# Patient Record
Sex: MALE | URBAN - NONMETROPOLITAN AREA
[De-identification: names, ages, dates, MRNs, and addresses within clinical notes are randomized per-mention and may not be internally consistent; named-entity substitution may affect disease eponyms.]

---

## 2018-05-07 ENCOUNTER — DOCTOR'S OFFICE (OUTPATIENT)
Dept: URBAN - NONMETROPOLITAN AREA CLINIC 1 | Facility: CLINIC | Age: 53
Setting detail: OPHTHALMOLOGY
End: 2018-05-07
Payer: COMMERCIAL

## 2018-05-07 ENCOUNTER — RX ONLY (RX ONLY)
Age: 53
End: 2018-05-07

## 2018-05-07 DIAGNOSIS — H52.4: ICD-10-CM

## 2018-05-07 DIAGNOSIS — H52.03: ICD-10-CM

## 2018-05-07 PROCEDURE — 92015 DETERMINE REFRACTIVE STATE: CPT | Performed by: OPTOMETRIST

## 2018-05-07 PROCEDURE — 92004 COMPRE OPH EXAM NEW PT 1/>: CPT | Performed by: OPTOMETRIST

## 2018-05-07 ASSESSMENT — REFRACTION_MANIFEST
OS_VA2: 20/
OS_VA1: 20/
OD_VA1: 20/
OD_VA2: 20/
OS_VA3: 20/
OD_VA3: 20/
OD_VA3: 20/
OD_VA2: 20/
OU_VA: 20/
OS_VA2: 20/
OU_VA: 20/
OS_VA1: 20/
OS_VA3: 20/
OD_VA1: 20/

## 2018-05-07 ASSESSMENT — SPHEQUIV_DERIVED
OS_SPHEQUIV: 2.625
OD_SPHEQUIV: 2.375

## 2018-05-07 ASSESSMENT — REFRACTION_OUTSIDERX
OD_VA1: 20/20
OS_SPHERE: +3.00
OS_VA2: 20/25
OD_VA3: 20/
OS_CYLINDER: -0.50
OD_ADD: +2.25
OD_SPHERE: +2.50
OS_ADD: +2.25
OS_VA3: 20/
OS_AXIS: 075
OU_VA: 20/20
OS_VA1: 20/20
OD_VA2: 20/25

## 2018-05-07 ASSESSMENT — REFRACTION_CURRENTRX
OS_OVR_VA: 20/
OD_VPRISM_DIRECTION: SV
OD_OVR_VA: 20/
OS_OVR_VA: 20/
OD_SPHERE: +3.25
OS_SPHERE: +3.25
OD_OVR_VA: 20/
OS_OVR_VA: 20/
OS_VPRISM_DIRECTION: SV
OD_OVR_VA: 20/

## 2018-05-07 ASSESSMENT — REFRACTION_AUTOREFRACTION
OD_AXIS: 76
OD_CYLINDER: -0.25
OS_SPHERE: +3.00
OS_CYLINDER: -0.75
OD_SPHERE: +2.50
OS_AXIS: 67

## 2018-05-07 ASSESSMENT — CONFRONTATIONAL VISUAL FIELD TEST (CVF)
OD_FINDINGS: FULL
OS_FINDINGS: FULL

## 2018-05-07 ASSESSMENT — VISUAL ACUITY
OS_BCVA: 20/100+1
OD_BCVA: 20/70

## 2018-07-05 ENCOUNTER — OPTICAL OFFICE (OUTPATIENT)
Dept: URBAN - NONMETROPOLITAN AREA CLINIC 4 | Facility: CLINIC | Age: 53
Setting detail: OPHTHALMOLOGY
End: 2018-07-05
Payer: COMMERCIAL

## 2018-07-05 DIAGNOSIS — H52.4: ICD-10-CM

## 2018-07-05 PROCEDURE — V2202 LENS SPHERE BIFOCAL 7.12-20.: HCPCS | Performed by: OPTOMETRIST

## 2018-07-05 PROCEDURE — V2784 LENS POLYCARB OR EQUAL: HCPCS | Performed by: OPTOMETRIST

## 2018-07-05 PROCEDURE — V2020 VISION SVCS FRAMES PURCHASES: HCPCS | Performed by: OPTOMETRIST

## 2018-07-05 PROCEDURE — V2203 LENS SPHCYL BIFOCAL 4.00D/.1: HCPCS | Performed by: OPTOMETRIST

## 2020-11-16 ENCOUNTER — DOCTOR'S OFFICE (OUTPATIENT)
Dept: URBAN - NONMETROPOLITAN AREA CLINIC 1 | Facility: CLINIC | Age: 55
Setting detail: OPHTHALMOLOGY
End: 2020-11-16
Payer: COMMERCIAL

## 2020-11-16 ENCOUNTER — OPTICAL OFFICE (OUTPATIENT)
Dept: URBAN - NONMETROPOLITAN AREA CLINIC 4 | Facility: CLINIC | Age: 55
Setting detail: OPHTHALMOLOGY
End: 2020-11-16
Payer: COMMERCIAL

## 2020-11-16 DIAGNOSIS — H52.4: ICD-10-CM

## 2020-11-16 DIAGNOSIS — H52.223: ICD-10-CM

## 2020-11-16 DIAGNOSIS — H52.03: ICD-10-CM

## 2020-11-16 PROCEDURE — V2020 VISION SVCS FRAMES PURCHASES: HCPCS | Performed by: OPTOMETRIST

## 2020-11-16 PROCEDURE — 92014 COMPRE OPH EXAM EST PT 1/>: CPT | Performed by: OPTOMETRIST

## 2020-11-16 PROCEDURE — 92015 DETERMINE REFRACTIVE STATE: CPT | Performed by: OPTOMETRIST

## 2020-11-16 PROCEDURE — V2202 LENS SPHERE BIFOCAL 7.12-20.: HCPCS | Performed by: OPTOMETRIST

## 2020-11-16 PROCEDURE — V2203 LENS SPHCYL BIFOCAL 4.00D/.1: HCPCS | Performed by: OPTOMETRIST

## 2020-11-16 PROCEDURE — V2784 LENS POLYCARB OR EQUAL: HCPCS | Performed by: OPTOMETRIST

## 2020-11-16 ASSESSMENT — CONFRONTATIONAL VISUAL FIELD TEST (CVF)
OS_FINDINGS: FULL
OD_FINDINGS: FULL

## 2020-11-16 ASSESSMENT — REFRACTION_CURRENTRX
OD_OVR_VA: 20/
OS_VPRISM_DIRECTION: SV
OS_SPHERE: +3.25
OD_SPHERE: +3.25
OS_OVR_VA: 20/
OD_VPRISM_DIRECTION: SV

## 2020-11-16 ASSESSMENT — REFRACTION_MANIFEST
OS_SPHERE: +3.50
OD_AXIS: 095
OS_VA1: 20/20
OD_VA1: 20/20
OD_ADD: +2.50
OS_AXIS: 090
OS_VA2: 20/25
OD_SPHERE: +2.75
OU_VA: 20/20
OS_ADD: +2.50
OD_CYLINDER: -0.25
OS_CYLINDER: -0.75
OD_VA2: 20/25

## 2020-11-16 ASSESSMENT — REFRACTION_AUTOREFRACTION
OS_AXIS: 092
OS_CYLINDER: -0.75
OD_CYLINDER: -0.25
OD_SPHERE: +2.75
OD_AXIS: 096
OS_SPHERE: +3.50

## 2020-11-16 ASSESSMENT — SPHEQUIV_DERIVED
OS_SPHEQUIV: 3.125
OD_SPHEQUIV: 2.625
OD_SPHEQUIV: 2.625
OS_SPHEQUIV: 3.125

## 2020-11-16 ASSESSMENT — TONOMETRY
OS_IOP_MMHG: 14
OD_IOP_MMHG: 14

## 2020-11-16 ASSESSMENT — VISUAL ACUITY
OD_BCVA: 20/150
OS_BCVA: 20/80

## 2021-02-03 ENCOUNTER — OPTICAL OFFICE (OUTPATIENT)
Dept: URBAN - NONMETROPOLITAN AREA CLINIC 4 | Facility: CLINIC | Age: 56
Setting detail: OPHTHALMOLOGY
End: 2021-02-03
Payer: COMMERCIAL

## 2021-02-03 DIAGNOSIS — H52.223: ICD-10-CM

## 2021-02-03 PROCEDURE — V2020 VISION SVCS FRAMES PURCHASES: HCPCS | Performed by: OPTOMETRIST

## 2021-02-08 ENCOUNTER — HOSPITAL ENCOUNTER (EMERGENCY)
Facility: HOSPITAL | Age: 56
Discharge: HOME | End: 2021-02-08
Attending: EMERGENCY MEDICINE | Admitting: EMERGENCY MEDICINE
Payer: COMMERCIAL

## 2021-02-08 ENCOUNTER — APPOINTMENT (EMERGENCY)
Dept: CT IMAGING | Facility: HOSPITAL | Age: 56
End: 2021-02-08
Payer: COMMERCIAL

## 2021-02-08 VITALS
HEART RATE: 104 BPM | OXYGEN SATURATION: 98 % | SYSTOLIC BLOOD PRESSURE: 162 MMHG | RESPIRATION RATE: 18 BRPM | TEMPERATURE: 98 F | WEIGHT: 246.91 LBS | DIASTOLIC BLOOD PRESSURE: 102 MMHG
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
## 2021-02-08 DIAGNOSIS — S20.229A CONTUSION OF MID BACK: ICD-10-CM

## 2021-02-08 PROCEDURE — 99283 EMERGENCY DEPT VISIT LOW MDM: CPT

## 2021-02-08 PROCEDURE — 72128 CT CHEST SPINE W/O DYE: CPT

## 2021-02-08 PROCEDURE — 99282 EMERGENCY DEPT VISIT SF MDM: CPT | Performed by: EMERGENCY MEDICINE

## 2021-02-08 RX ORDER — OXYCODONE HYDROCHLORIDE AND ACETAMINOPHEN 5; 325 MG/1; MG/1
1 TABLET ORAL ONCE
Status: COMPLETED | OUTPATIENT
Start: 2021-02-08 | End: 2021-02-08

## 2021-02-08 RX ADMIN — OXYCODONE HYDROCHLORIDE AND ACETAMINOPHEN 1 TABLET: 5; 325 TABLET ORAL at 01:17

## 2021-02-08 NOTE — ED PROVIDER NOTES
History  Chief Complaint   Patient presents with    Back Pain     Patient states he slipped and fell on ice earlier today  Reporting mid back pain, right side  States he hit his head but no LOC  No blood thinners  Took motrin PTA  Patient is a 61-year-old male presents the emergency department complaining of right lower thoracic and midthoracic back pain after he slipped and fell onto his back earlier today after he helped another lady up who slipped on the ice also  Patient reports that he did fall onto his back and hit his head but denies any loss of consciousness no anticoagulants no head pain no neck pain no other injury  Patient took ibuprofen for the pain prior to arrival he ambulated into the ED  No lower back pain  No numbness or weakness  History provided by:  Patient  Fall  Mechanism of injury: fall    Injury location:  Torso  Torso injury location:  Back  Time since incident:  1 day  Fall:     Fall occurred:  Walking    Impact surface: Ice  Associated symptoms: back pain    Associated symptoms: no abdominal pain, no chest pain, no headaches, no nausea and no vomiting        None       History reviewed  No pertinent past medical history  History reviewed  No pertinent surgical history  History reviewed  No pertinent family history  I have reviewed and agree with the history as documented  E-Cigarette/Vaping    E-Cigarette Use Never User      E-Cigarette/Vaping Substances     Social History     Tobacco Use    Smoking status: Never Smoker    Smokeless tobacco: Current User     Types: Chew   Substance Use Topics    Alcohol use: Not Currently    Drug use: Not Currently       Review of Systems   Constitutional: Negative for activity change, appetite change, chills, fatigue and fever  HENT: Negative for congestion, ear pain, rhinorrhea and sore throat  Eyes: Negative for discharge, redness and visual disturbance     Respiratory: Negative for cough, chest tightness, shortness of breath and wheezing  Cardiovascular: Negative for chest pain and palpitations  Gastrointestinal: Negative for abdominal pain, constipation, diarrhea, nausea and vomiting  Endocrine: Negative for polydipsia and polyuria  Genitourinary: Negative for difficulty urinating, dysuria, frequency, hematuria and urgency  Musculoskeletal: Positive for back pain  Negative for arthralgias and myalgias  Skin: Negative for color change, pallor and rash  Neurological: Negative for dizziness, weakness, light-headedness, numbness and headaches  Hematological: Negative for adenopathy  Does not bruise/bleed easily  All other systems reviewed and are negative  Physical Exam  Physical Exam  Vitals signs and nursing note reviewed  Constitutional:       Appearance: He is well-developed  HENT:      Head: Normocephalic and atraumatic  Right Ear: External ear normal       Left Ear: External ear normal       Nose: Nose normal    Eyes:      Conjunctiva/sclera: Conjunctivae normal       Pupils: Pupils are equal, round, and reactive to light  Neck:      Musculoskeletal: Normal range of motion and neck supple  Cardiovascular:      Rate and Rhythm: Normal rate and regular rhythm  Heart sounds: Normal heart sounds  Pulmonary:      Effort: Pulmonary effort is normal  No respiratory distress  Breath sounds: Normal breath sounds  No wheezing or rales  Chest:      Chest wall: No tenderness  Abdominal:      General: Bowel sounds are normal  There is no distension  Palpations: Abdomen is soft  Tenderness: There is no abdominal tenderness  There is no guarding  Musculoskeletal:      Thoracic back: He exhibits decreased range of motion, tenderness, pain and spasm  Skin:     General: Skin is warm and dry  Neurological:      Mental Status: He is alert and oriented to person, place, and time  Cranial Nerves: No cranial nerve deficit  Sensory: No sensory deficit           Vital Signs  ED Triage Vitals [02/08/21 0023]   Temperature Pulse Respirations Blood Pressure SpO2   98 °F (36 7 °C) (!) 115 18 (!) 185/118 98 %      Temp Source Heart Rate Source Patient Position - Orthostatic VS BP Location FiO2 (%)   Temporal Monitor Sitting Right arm --      Pain Score       --           Vitals:    02/08/21 0023 02/08/21 0045   BP: (!) 185/118 (!) 162/102   Pulse: (!) 115 104   Patient Position - Orthostatic VS: Sitting          Visual Acuity  Visual Acuity      Most Recent Value   L Pupil Size (mm)  2   R Pupil Size (mm)  2          ED Medications  Medications - No data to display    Diagnostic Studies  Results Reviewed     None                 CT thoracic spine without contrast   Final Result by Sarah Ragland MD (02/08 0109)      No thoracic spine fracture or subluxation  Workstation performed: [id number]                    Procedures  Procedures         ED Course                             SBIRT 22yo+      Most Recent Value   SBIRT (23 yo +)   In order to provide better care to our patients, we are screening all of our patients for alcohol and drug use  Would it be okay to ask you these screening questions? No Filed at: 02/08/2021 0041                    MDM  Number of Diagnoses or Management Options  Contusion of mid back: new and requires workup  Fall, initial encounter: new and requires workup  Diagnosis management comments: Patient is clinically hemodynamically and neurologically stable in the emergency department no indication for CT imaging the brain based on mechanism and history  CT of the thoracic spine reveals no acute fracture or traumatic dislocation advised supportive care for back strain and contusion and follow-up with primary physician Orthopedics for further evaluation treatment return precautions and anticipatory guidance discussed           Amount and/or Complexity of Data Reviewed  Tests in the radiology section of CPT®: ordered and reviewed  Decide to obtain previous medical records or to obtain history from someone other than the patient: yes  Review and summarize past medical records: yes  Independent visualization of images, tracings, or specimens: yes    Risk of Complications, Morbidity, and/or Mortality  Presenting problems: moderate  Diagnostic procedures: moderate  Management options: moderate    Patient Progress  Patient progress: stable      Disposition  Final diagnoses:   Fall, initial encounter   Contusion of mid back     Time reflects when diagnosis was documented in both MDM as applicable and the Disposition within this note     Time User Action Codes Description Comment    2/8/2021  1:12 AM Adan Moore Add [F12  XXXA] Fall, initial encounter     2/8/2021  1:12 AM Adan Moore Add [S20 229A] Contusion of mid back       ED Disposition     ED Disposition Condition Date/Time Comment    Discharge Stable Mon Feb 8, 2021  1:12 AM Frances Arriaza discharge to home/self care  Follow-up Information     Follow up With Specialties Details Why Contact Info    Lan Amaya MD  Schedule an appointment as soon as possible for a visit in 3 days  [address] [place] [id number]  [address] [place] [address] Orthopedic Surgery Schedule an appointment as soon as possible for a visit in 3 days  [address] [facility]  [address] [facility]  [phone]            Patient's Medications    No medications on file     No discharge procedures on file      PDMP Review     None          ED Provider  Electronically Signed by           Mathieu Johnston DO  02/08/21 9197

## 2021-02-13 ENCOUNTER — HOSPITAL ENCOUNTER (EMERGENCY)
Facility: HOSPITAL | Age: 56
Discharge: HOME | End: 2021-02-13
Attending: EMERGENCY MEDICINE
Payer: COMMERCIAL

## 2021-02-13 ENCOUNTER — APPOINTMENT (EMERGENCY)
Dept: RADIOLOGY | Facility: HOSPITAL | Age: 56
End: 2021-02-13
Payer: COMMERCIAL

## 2021-02-13 VITALS
TEMPERATURE: 97.5 F | HEART RATE: 114 BPM | RESPIRATION RATE: 18 BRPM | OXYGEN SATURATION: 98 % | SYSTOLIC BLOOD PRESSURE: 173 MMHG | DIASTOLIC BLOOD PRESSURE: 119 MMHG | WEIGHT: 246.91 LBS
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
## 2021-02-13 DIAGNOSIS — S20.221A CONTUSION OF RIGHT BACK WALL OF THORAX, INITIAL ENCOUNTER: ICD-10-CM

## 2021-02-13 DIAGNOSIS — S39.012A BACK STRAIN, INITIAL ENCOUNTER: ICD-10-CM

## 2021-02-13 PROCEDURE — 99283 EMERGENCY DEPT VISIT LOW MDM: CPT

## 2021-02-13 PROCEDURE — 72070 X-RAY EXAM THORAC SPINE 2VWS: CPT

## 2021-02-13 PROCEDURE — 99284 EMERGENCY DEPT VISIT MOD MDM: CPT | Performed by: EMERGENCY MEDICINE

## 2021-02-13 RX ORDER — OXYCODONE HYDROCHLORIDE AND ACETAMINOPHEN 5; 325 MG/1; MG/1
1 TABLET ORAL ONCE
Status: COMPLETED | OUTPATIENT
Start: 2021-02-13 | End: 2021-02-13

## 2021-02-13 RX ORDER — OXYCODONE HYDROCHLORIDE AND ACETAMINOPHEN 5; 325 MG/1; MG/1
1 TABLET ORAL EVERY 4 HOURS PRN
Qty: 15 TABLET | Refills: 0 | Status: SHIPPED | OUTPATIENT
Start: 2021-02-13

## 2021-02-13 RX ADMIN — OXYCODONE HYDROCHLORIDE AND ACETAMINOPHEN 1 TABLET: 5; 325 TABLET ORAL at 21:09

## 2021-02-14 NOTE — ED PROVIDER NOTES
History  Chief Complaint   Patient presents with    Back Pain     Patient fell approximately 2 days ago and "felt a pop" in his back  Reporting increased pain and states "something is moving back there " Did not hit his head  No LOC  No blood thinners  Patient is a 49-year-old male presents the emergency department complaining of right upper back pain after he tripped and fell on ice 2nd time in 5 days this occurred denies any strike on head or loss of consciousness no anticoagulants patient complains of pain in the right midthoracic spine just under the right scapula worse with movement similar to the pain that he had after his fall that he was evaluated for with a CT of thoracic spine 5 days ago  No loss of bladder or bowel no numbness or weakness in the arms or legs  Patient has scheduled a follow-up appointment with Orthopedics as recommended however he has not been seen yet  History provided by:  Patient  Back Pain  Location:  Thoracic spine  Quality:  Aching and cramping  Radiates to:  Does not radiate  Pain severity:  Moderate  Pain is: Worse during the day  Onset quality:  Gradual  Duration:  2 days  Timing:  Constant  Progression:  Worsening  Chronicity:  Recurrent  Context: falling and recent injury    Associated symptoms: no abdominal pain, no chest pain, no dysuria, no fever, no headaches, no numbness and no weakness        None       No past medical history on file  No past surgical history on file  No family history on file  I have reviewed and agree with the history as documented  E-Cigarette/Vaping    E-Cigarette Use Never User      E-Cigarette/Vaping Substances     Social History     Tobacco Use    Smoking status: Never Smoker    Smokeless tobacco: Current User     Types: Chew   Substance Use Topics    Alcohol use: Not Currently    Drug use: Not Currently       Review of Systems   Constitutional: Negative for activity change, appetite change, chills, fatigue and fever  HENT: Negative for congestion, ear pain, rhinorrhea and sore throat  Eyes: Negative for discharge, redness and visual disturbance  Respiratory: Negative for cough, chest tightness, shortness of breath and wheezing  Cardiovascular: Negative for chest pain and palpitations  Gastrointestinal: Negative for abdominal pain, constipation, diarrhea, nausea and vomiting  Endocrine: Negative for polydipsia and polyuria  Genitourinary: Negative for difficulty urinating, dysuria, frequency, hematuria and urgency  Musculoskeletal: Positive for back pain  Negative for arthralgias and myalgias  Skin: Negative for color change, pallor and rash  Neurological: Negative for dizziness, weakness, light-headedness, numbness and headaches  Hematological: Negative for adenopathy  Does not bruise/bleed easily  All other systems reviewed and are negative  Physical Exam  Physical Exam  Vitals signs and nursing note reviewed  Constitutional:       Appearance: He is well-developed  HENT:      Head: Normocephalic and atraumatic  Right Ear: External ear normal       Left Ear: External ear normal       Nose: Nose normal    Eyes:      Conjunctiva/sclera: Conjunctivae normal       Pupils: Pupils are equal, round, and reactive to light  Neck:      Musculoskeletal: Normal range of motion and neck supple  Cardiovascular:      Rate and Rhythm: Normal rate and regular rhythm  Heart sounds: Normal heart sounds  Pulmonary:      Effort: Pulmonary effort is normal  No respiratory distress  Breath sounds: Normal breath sounds  No wheezing or rales  Chest:      Chest wall: No tenderness  Abdominal:      General: Bowel sounds are normal  There is no distension  Palpations: Abdomen is soft  Tenderness: There is no abdominal tenderness  There is no guarding  Musculoskeletal:      Thoracic back: He exhibits tenderness, pain and spasm  He exhibits normal range of motion     Skin:     General: Skin is warm and dry  Neurological:      Mental Status: He is alert and oriented to person, place, and time  Cranial Nerves: No cranial nerve deficit  Sensory: No sensory deficit  Vital Signs  ED Triage Vitals [02/13/21 2105]   Temperature Pulse Respirations Blood Pressure SpO2   97 5 °F (36 4 °C) (!) 114 18 (!) 173/119 98 %      Temp Source Heart Rate Source Patient Position - Orthostatic VS BP Location FiO2 (%)   Temporal Monitor Sitting Right arm --      Pain Score       8           Vitals:    02/13/21 2105   BP: (!) 173/119   Pulse: (!) 114   Patient Position - Orthostatic VS: Sitting         Visual Acuity      ED Medications  Medications   oxyCODONE-acetaminophen (PERCOCET) 5-325 mg per tablet 1 tablet (1 tablet Oral Given 2/13/21 2109)       Diagnostic Studies  Results Reviewed     None                 XR thoracic spine 2 views   ED Interpretation by Chinmay Juárez DO (02/13 2133)   No acute fracture or dislocation                 Procedures  Procedures         ED Course                             SBIRT 20yo+      Most Recent Value   SBIRT (23 yo +)   In order to provide better care to our patients, we are screening all of our patients for alcohol and drug use  Would it be okay to ask you these screening questions?   Unable to answer at this time Filed at: 02/13/2021 2112                    MDM  Number of Diagnoses or Management Options  Back strain, initial encounter: new and requires workup  Contusion of right back wall of thorax, initial encounter: new and requires workup  Fall, initial encounter: new and requires workup  Diagnosis management comments: Patient is clinically hemodynamically neurologically stable and intact in the ED he is ambulatory in the ED without issue history and physical examination is consistent with a recurrent contusion and strain of the right upper back repeat thoracic spine x-rays reveal no acute fracture dislocation at this time advised continue supportive care will prescribe oral analgesics for additional pain control advised prompt follow-up with Orthopedics for further evaluation treatment he reports that he already has that scheduled and will follow up promptly Orthopedics and PCP as advised return precautions and anticipatory guidance discussed  Amount and/or Complexity of Data Reviewed  Tests in the radiology section of CPT®: ordered and reviewed  Decide to obtain previous medical records or to obtain history from someone other than the patient: yes  Review and summarize past medical records: yes  Independent visualization of images, tracings, or specimens: yes    Risk of Complications, Morbidity, and/or Mortality  Presenting problems: low  Diagnostic procedures: low  Management options: low    Patient Progress  Patient progress: stable      Disposition  Final diagnoses:   Fall, initial encounter   Contusion of right back wall of thorax, initial encounter   Back strain, initial encounter     Time reflects when diagnosis was documented in both MDM as applicable and the Disposition within this note     Time User Action Codes Description Comment    2/13/2021  9:10 PM Tamara Wallace Add [W19  FJFR] Fall, initial encounter     2/13/2021  9:10 PM Tamara Wallace Add [S20 221A] Contusion of right back wall of thorax, initial encounter     2/13/2021  9:10 PM David López Add [S39 012A] Back strain, initial encounter       ED Disposition     ED Disposition Condition Date/Time Comment    Discharge Stable Sat Feb 13, 2021  9:10 PM Humberto Haider discharge to home/self care              Follow-up Information     Follow up With Specialties Details Why Contact Rob Traylor DO Orthopedic Surgery Schedule an appointment as soon as possible for a visit in 3 days  [address] Jake Caro MD  Schedule an appointment as soon as possible for a visit in 3 days  [address] [facility]  [phone] Patient's Medications   Discharge Prescriptions    OXYCODONE-ACETAMINOPHEN (PERCOCET) 5-325 MG PER TABLET    Take 1 tablet by mouth every 4 (four) hours as needed for moderate pain for up to 15 dosesMax Daily Amount: 6 tablets       Start Date: 2/13/2021 End Date: --       Order Dose: 1 tablet       Quantity: 15 tablet    Refills: 0         PDMP Review       Value Time User    PDMP Reviewed  Yes 2/13/2021  9:02 PM Sara Vuong DO          ED Provider  Electronically Signed by           Sara Vuong DO  02/13/21 6201

## 2021-04-08 ENCOUNTER — OPTICAL OFFICE (OUTPATIENT)
Dept: URBAN - NONMETROPOLITAN AREA CLINIC 4 | Facility: CLINIC | Age: 56
Setting detail: OPHTHALMOLOGY
End: 2021-04-08
Payer: COMMERCIAL

## 2021-04-08 DIAGNOSIS — H52.223: ICD-10-CM

## 2021-04-08 PROCEDURE — V2784 LENS POLYCARB OR EQUAL: HCPCS | Performed by: OPTOMETRIST

## 2021-04-08 PROCEDURE — V2203 LENS SPHCYL BIFOCAL 4.00D/.1: HCPCS | Performed by: OPTOMETRIST

## 2021-04-08 PROCEDURE — V2020 VISION SVCS FRAMES PURCHASES: HCPCS | Performed by: OPTOMETRIST

## 2021-04-08 PROCEDURE — V2202 LENS SPHERE BIFOCAL 7.12-20.: HCPCS | Performed by: OPTOMETRIST

## 2021-09-07 ENCOUNTER — OPTICAL OFFICE (OUTPATIENT)
Dept: URBAN - NONMETROPOLITAN AREA CLINIC 4 | Facility: CLINIC | Age: 56
Setting detail: OPHTHALMOLOGY
End: 2021-09-07
Payer: COMMERCIAL

## 2021-09-07 DIAGNOSIS — H52.223: ICD-10-CM

## 2021-09-07 PROCEDURE — V2020 VISION SVCS FRAMES PURCHASES: HCPCS | Performed by: OPTOMETRIST

## 2021-09-07 PROCEDURE — V2784 LENS POLYCARB OR EQUAL: HCPCS | Performed by: OPTOMETRIST

## 2021-09-07 PROCEDURE — V2202 LENS SPHERE BIFOCAL 7.12-20.: HCPCS | Performed by: OPTOMETRIST

## 2021-11-17 ENCOUNTER — DOCTOR'S OFFICE (OUTPATIENT)
Dept: URBAN - NONMETROPOLITAN AREA CLINIC 1 | Facility: CLINIC | Age: 56
Setting detail: OPHTHALMOLOGY
End: 2021-11-17
Payer: COMMERCIAL

## 2021-11-17 VITALS — HEIGHT: 60 IN

## 2021-11-17 DIAGNOSIS — H25.013: ICD-10-CM

## 2021-11-17 DIAGNOSIS — H43.393: ICD-10-CM

## 2021-11-17 DIAGNOSIS — H52.4: ICD-10-CM

## 2021-11-17 PROBLEM — H52.03 HYPEROPIA; BOTH EYES: Status: ACTIVE | Noted: 2018-05-07

## 2021-11-17 PROCEDURE — 92015 DETERMINE REFRACTIVE STATE: CPT | Performed by: OPTOMETRIST

## 2021-11-17 PROCEDURE — 92014 COMPRE OPH EXAM EST PT 1/>: CPT | Performed by: OPTOMETRIST

## 2021-11-17 ASSESSMENT — SPHEQUIV_DERIVED
OS_SPHEQUIV: 3.25
OS_SPHEQUIV: 3.125
OD_SPHEQUIV: 2.875
OD_SPHEQUIV: 3.25

## 2021-11-17 ASSESSMENT — REFRACTION_CURRENTRX
OS_SPHERE: +3.50
OS_VPRISM_DIRECTION: SV
OD_CYLINDER: -0.25
OD_VPRISM_DIRECTION: SV
OS_CYLINDER: -0.75
OS_OVR_VA: 20/
OD_OVR_VA: 20/
OD_SPHERE: +2.75
OS_AXIS: 94
OD_AXIS: 90

## 2021-11-17 ASSESSMENT — CONFRONTATIONAL VISUAL FIELD TEST (CVF)
OS_FINDINGS: FULL
OD_FINDINGS: FULL

## 2021-11-17 ASSESSMENT — REFRACTION_AUTOREFRACTION
OD_CYLINDER: 0.00
OS_SPHERE: +3.25
OD_AXIS: 180
OS_AXIS: 180
OD_SPHERE: +3.25
OS_CYLINDER: 0.00

## 2021-11-17 ASSESSMENT — REFRACTION_MANIFEST
OS_ADD: +2.50
OD_SPHERE: +3.00
OD_ADD: +2.50
OD_VA1: 20/20
OS_VA1: 20/20
OS_AXIS: 090
OS_VA2: 20/25
OU_VA: 20/20
OD_VA2: 20/25
OD_CYLINDER: -0.25
OS_SPHERE: +3.50
OS_CYLINDER: -0.75
OD_AXIS: 095

## 2021-11-17 ASSESSMENT — VISUAL ACUITY
OD_BCVA: 20/20
OS_BCVA: 20/20

## 2022-06-29 ENCOUNTER — OPTICAL OFFICE (OUTPATIENT)
Dept: URBAN - NONMETROPOLITAN AREA CLINIC 4 | Facility: CLINIC | Age: 57
Setting detail: OPHTHALMOLOGY
End: 2022-06-29
Payer: COMMERCIAL

## 2022-06-29 DIAGNOSIS — H52.223: ICD-10-CM

## 2022-06-29 PROCEDURE — V2020 VISION SVCS FRAMES PURCHASES: HCPCS | Performed by: OPTOMETRIST

## 2022-06-29 PROCEDURE — V2784 LENS POLYCARB OR EQUAL: HCPCS | Performed by: OPTOMETRIST

## 2022-06-29 PROCEDURE — V2203 LENS SPHCYL BIFOCAL 4.00D/.1: HCPCS | Performed by: OPTOMETRIST

## 2022-06-29 PROCEDURE — V2202 LENS SPHERE BIFOCAL 7.12-20.: HCPCS | Performed by: OPTOMETRIST

## 2022-11-21 ENCOUNTER — DOCTOR'S OFFICE (OUTPATIENT)
Dept: URBAN - NONMETROPOLITAN AREA CLINIC 1 | Facility: CLINIC | Age: 57
Setting detail: OPHTHALMOLOGY
End: 2022-11-21
Payer: COMMERCIAL

## 2022-11-21 DIAGNOSIS — H52.03: ICD-10-CM

## 2022-11-21 DIAGNOSIS — H52.4: ICD-10-CM

## 2022-11-21 PROCEDURE — 92014 COMPRE OPH EXAM EST PT 1/>: CPT | Performed by: OPTOMETRIST

## 2022-11-21 PROCEDURE — 92015 DETERMINE REFRACTIVE STATE: CPT | Performed by: OPTOMETRIST

## 2022-11-21 ASSESSMENT — REFRACTION_CURRENTRX
OS_CYLINDER: -0.75
OD_OVR_VA: 20/
OS_AXIS: 94
OD_SPHERE: +2.75
OS_OVR_VA: 20/
OD_VPRISM_DIRECTION: SV
OS_VPRISM_DIRECTION: SV
OD_CYLINDER: -0.25
OS_SPHERE: +3.50
OD_AXIS: 90

## 2022-11-21 ASSESSMENT — REFRACTION_MANIFEST
OS_CYLINDER: -0.75
OS_AXIS: 090
OS_VA1: 20/20-2
OU_VA: 20/20
OD_ADD: +2.50
OD_SPHERE: +2.75
OD_CYLINDER: -0.50
OD_VA1: 20/20-2
OS_VA2: 20/25
OS_ADD: +2.50
OS_SPHERE: +3.00
OD_AXIS: 090
OD_VA2: 20/25

## 2022-11-21 ASSESSMENT — SPHEQUIV_DERIVED
OD_SPHEQUIV: 2.5
OD_SPHEQUIV: 2.5
OS_SPHEQUIV: 2.625
OS_SPHEQUIV: 2.375

## 2022-11-21 ASSESSMENT — CONFRONTATIONAL VISUAL FIELD TEST (CVF)
OD_FINDINGS: FULL
OS_FINDINGS: FULL

## 2022-11-21 ASSESSMENT — REFRACTION_AUTOREFRACTION
OS_CYLINDER: -0.75
OD_SPHERE: +2.75
OD_AXIS: 086
OS_SPHERE: +2.75
OD_CYLINDER: -0.50
OS_AXIS: 101

## 2022-11-21 ASSESSMENT — TONOMETRY
OD_IOP_MMHG: 14
OS_IOP_MMHG: 14

## 2022-11-21 ASSESSMENT — VISUAL ACUITY
OD_BCVA: 20/25-1
OS_BCVA: 20/25-1

## 2024-03-13 ENCOUNTER — OPTICAL OFFICE (OUTPATIENT)
Dept: URBAN - NONMETROPOLITAN AREA CLINIC 4 | Facility: CLINIC | Age: 59
Setting detail: OPHTHALMOLOGY
End: 2024-03-13
Payer: COMMERCIAL

## 2024-03-13 ENCOUNTER — DOCTOR'S OFFICE (OUTPATIENT)
Dept: URBAN - NONMETROPOLITAN AREA CLINIC 1 | Facility: CLINIC | Age: 59
Setting detail: OPHTHALMOLOGY
End: 2024-03-13
Payer: COMMERCIAL

## 2024-03-13 VITALS — HEIGHT: 60 IN

## 2024-03-13 DIAGNOSIS — H52.03: ICD-10-CM

## 2024-03-13 DIAGNOSIS — H52.4: ICD-10-CM

## 2024-03-13 DIAGNOSIS — H43.393: ICD-10-CM

## 2024-03-13 DIAGNOSIS — H25.013: ICD-10-CM

## 2024-03-13 PROCEDURE — V2784 LENS POLYCARB OR EQUAL: HCPCS | Performed by: OPTOMETRIST

## 2024-03-13 PROCEDURE — V2020 VISION SVCS FRAMES PURCHASES: HCPCS | Performed by: OPTOMETRIST

## 2024-03-13 PROCEDURE — V2203 LENS SPHCYL BIFOCAL 4.00D/.1: HCPCS | Mod: LT | Performed by: OPTOMETRIST

## 2024-03-13 PROCEDURE — 92134 CPTRZ OPH DX IMG PST SGM RTA: CPT | Performed by: OPTOMETRIST

## 2024-03-13 PROCEDURE — V2203 LENS SPHCYL BIFOCAL 4.00D/.1: HCPCS | Performed by: OPTOMETRIST

## 2024-03-13 PROCEDURE — 92014 COMPRE OPH EXAM EST PT 1/>: CPT | Performed by: OPTOMETRIST

## 2024-03-13 PROCEDURE — 92015 DETERMINE REFRACTIVE STATE: CPT | Performed by: OPTOMETRIST

## 2024-03-13 PROCEDURE — V2784 LENS POLYCARB OR EQUAL: HCPCS | Mod: LT | Performed by: OPTOMETRIST

## 2024-08-26 ENCOUNTER — OPTICAL OFFICE (OUTPATIENT)
Dept: URBAN - NONMETROPOLITAN AREA CLINIC 4 | Facility: CLINIC | Age: 59
Setting detail: OPHTHALMOLOGY
End: 2024-08-26
Payer: COMMERCIAL

## 2024-08-26 DIAGNOSIS — H52.03: ICD-10-CM

## 2024-08-26 PROCEDURE — V2020 VISION SVCS FRAMES PURCHASES: HCPCS | Performed by: OPTOMETRIST

## 2024-08-26 PROCEDURE — V2203 LENS SPHCYL BIFOCAL 4.00D/.1: HCPCS | Performed by: OPTOMETRIST

## 2024-08-26 PROCEDURE — V2203 LENS SPHCYL BIFOCAL 4.00D/.1: HCPCS | Mod: LT | Performed by: OPTOMETRIST

## 2024-08-26 PROCEDURE — V2784 LENS POLYCARB OR EQUAL: HCPCS | Performed by: OPTOMETRIST

## 2024-08-26 PROCEDURE — V2784 LENS POLYCARB OR EQUAL: HCPCS | Mod: LT | Performed by: OPTOMETRIST

## 2025-03-17 ENCOUNTER — OPTICAL OFFICE (OUTPATIENT)
Dept: URBAN - NONMETROPOLITAN AREA CLINIC 4 | Facility: CLINIC | Age: 60
Setting detail: OPHTHALMOLOGY
End: 2025-03-17
Payer: COMMERCIAL

## 2025-03-17 ENCOUNTER — DOCTOR'S OFFICE (OUTPATIENT)
Dept: URBAN - NONMETROPOLITAN AREA CLINIC 1 | Facility: CLINIC | Age: 60
Setting detail: OPHTHALMOLOGY
End: 2025-03-17
Payer: COMMERCIAL

## 2025-03-17 DIAGNOSIS — H52.03: ICD-10-CM

## 2025-03-17 DIAGNOSIS — H25.013: ICD-10-CM

## 2025-03-17 DIAGNOSIS — H43.393: ICD-10-CM

## 2025-03-17 DIAGNOSIS — H52.4: ICD-10-CM

## 2025-03-17 PROCEDURE — V2203 LENS SPHCYL BIFOCAL 4.00D/.1: HCPCS | Performed by: OPTOMETRIST

## 2025-03-17 PROCEDURE — V2784 LENS POLYCARB OR EQUAL: HCPCS | Performed by: OPTOMETRIST

## 2025-03-17 PROCEDURE — 92014 COMPRE OPH EXAM EST PT 1/>: CPT | Performed by: OPTOMETRIST

## 2025-03-17 PROCEDURE — V2784 LENS POLYCARB OR EQUAL: HCPCS | Mod: LT | Performed by: OPTOMETRIST

## 2025-03-17 PROCEDURE — 92015 DETERMINE REFRACTIVE STATE: CPT | Performed by: OPTOMETRIST

## 2025-03-17 PROCEDURE — V2020 VISION SVCS FRAMES PURCHASES: HCPCS | Performed by: OPTOMETRIST

## 2025-03-17 PROCEDURE — V2203 LENS SPHCYL BIFOCAL 4.00D/.1: HCPCS | Mod: LT | Performed by: OPTOMETRIST

## 2025-03-17 ASSESSMENT — REFRACTION_AUTOREFRACTION
OD_AXIS: 055
OS_AXIS: 106
OS_SPHERE: +3.00
OD_SPHERE: +3.00
OD_CYLINDER: -0.25
OS_CYLINDER: -0.75

## 2025-03-17 ASSESSMENT — CONFRONTATIONAL VISUAL FIELD TEST (CVF)
OS_FINDINGS: FULL
OD_FINDINGS: FULL

## 2025-03-17 ASSESSMENT — VISUAL ACUITY
OD_BCVA: 20/20
OS_BCVA: 20/20

## 2025-03-17 ASSESSMENT — REFRACTION_CURRENTRX
OD_OVR_VA: 20/
OS_ADD: +2.75
OS_SPHERE: +3.75
OD_SPHERE: +3.25
OS_AXIS: 101
OD_CYLINDER: -0.25
OS_CYLINDER: -0.75
OS_OVR_VA: 20/
OD_AXIS: 023
OS_VPRISM_DIRECTION: BF
OD_VPRISM_DIRECTION: BF
OD_ADD: +2.75

## 2025-03-17 ASSESSMENT — REFRACTION_MANIFEST
OS_CYLINDER: -0.75
OS_ADD: +2.50
OD_CYLINDER: -0.25
OD_AXIS: 025
OS_AXIS: 105
OD_ADD: +2.50
OD_SPHERE: +3.25
OS_SPHERE: +3.75
OD_VA1: 20/20-2
OU_VA: 20/20
OS_VA2: 20/25
OD_VA2: 20/25
OS_VA1: 20/20-2

## 2025-03-17 ASSESSMENT — KERATOMETRY
OS_K1POWER_DIOPTERS: 41.75
OD_K1POWER_DIOPTERS: 41.75
OD_AXISANGLE_DEGREES: 085
OD_K2POWER_DIOPTERS: 42.75
OS_AXISANGLE_DEGREES: 102
OS_K2POWER_DIOPTERS: 42.75

## 2025-03-17 ASSESSMENT — TONOMETRY
OD_IOP_MMHG: 14
OS_IOP_MMHG: 14